# Patient Record
Sex: FEMALE | Race: OTHER | NOT HISPANIC OR LATINO | URBAN - METROPOLITAN AREA
[De-identification: names, ages, dates, MRNs, and addresses within clinical notes are randomized per-mention and may not be internally consistent; named-entity substitution may affect disease eponyms.]

---

## 2024-03-22 ENCOUNTER — OFFICE VISIT (OUTPATIENT)
Dept: PRIMARY CARE | Facility: CLINIC | Age: 29
End: 2024-03-22
Payer: COMMERCIAL

## 2024-03-22 VITALS
WEIGHT: 156.2 LBS | HEART RATE: 71 BPM | DIASTOLIC BLOOD PRESSURE: 80 MMHG | SYSTOLIC BLOOD PRESSURE: 116 MMHG | OXYGEN SATURATION: 98 %

## 2024-03-22 DIAGNOSIS — Z00.00 WELL WOMAN EXAM WITHOUT GYNECOLOGICAL EXAM: Primary | ICD-10-CM

## 2024-03-22 DIAGNOSIS — F50.2: ICD-10-CM

## 2024-03-22 PROCEDURE — 1036F TOBACCO NON-USER: CPT | Performed by: FAMILY MEDICINE

## 2024-03-22 PROCEDURE — 99385 PREV VISIT NEW AGE 18-39: CPT | Performed by: FAMILY MEDICINE

## 2024-03-22 RX ORDER — NORGESTIMATE AND ETHINYL ESTRADIOL 0.25-0.035
KIT ORAL
COMMUNITY
Start: 2015-08-18

## 2024-03-22 RX ORDER — FLUOXETINE HYDROCHLORIDE 40 MG/1
40 CAPSULE ORAL DAILY
Qty: 90 CAPSULE | Refills: 3 | Status: SHIPPED | OUTPATIENT
Start: 2024-03-22 | End: 2025-03-22

## 2024-03-22 RX ORDER — FLUOXETINE HYDROCHLORIDE 40 MG/1
1 CAPSULE ORAL DAILY
COMMUNITY
Start: 2018-10-11 | End: 2024-03-22 | Stop reason: SDUPTHER

## 2024-03-22 ASSESSMENT — PATIENT HEALTH QUESTIONNAIRE - PHQ9
2. FEELING DOWN, DEPRESSED OR HOPELESS: NOT AT ALL
SUM OF ALL RESPONSES TO PHQ9 QUESTIONS 1 AND 2: 0
1. LITTLE INTEREST OR PLEASURE IN DOING THINGS: NOT AT ALL

## 2024-03-22 ASSESSMENT — ENCOUNTER SYMPTOMS
BACK PAIN: 0
ARTHRALGIAS: 0
DECREASED CONCENTRATION: 0
SHORTNESS OF BREATH: 0
UNEXPECTED WEIGHT CHANGE: 0
CONSTIPATION: 0
DIARRHEA: 0
HEADACHES: 0
FATIGUE: 0
NECK PAIN: 0
ROS GI COMMENTS: DENIES HEARTBURN
ABDOMINAL PAIN: 0
SLEEP DISTURBANCE: 0
NERVOUS/ANXIOUS: 0

## 2024-03-22 NOTE — PROGRESS NOTES
Subjective   Patient ID: Ashley Yousif is a 28 y.o. female who presents for Annual Exam (PT is here for yearly physical).  HPI  Ashley presents for wellness visit.  Generally she feels well.  She does have OB/GYN.  She had a syncopal episode 2 weekends ago at a concert; this had never happened before.  She was not intoxicated; possibly dehydrated.  Review of Systems   Constitutional:  Negative for fatigue and unexpected weight change.   HENT:  Negative for hearing loss.    Eyes:  Negative for visual disturbance.   Respiratory:  Negative for shortness of breath.    Cardiovascular:  Negative for chest pain.   Gastrointestinal:  Negative for abdominal pain, constipation and diarrhea.        Denies heartburn   Genitourinary:  Negative for menstrual problem.   Musculoskeletal:  Negative for arthralgias, back pain and neck pain.   Skin:         Mild eczema, worse in winter   Neurological:  Negative for headaches.   Psychiatric/Behavioral:  Negative for decreased concentration and sleep disturbance. The patient is not nervous/anxious.        Objective   Vitals:    03/22/24 0802   BP: 116/80   Pulse: 71   SpO2: 98%   Weight: 70.9 kg (156 lb 3.2 oz)      Physical Exam  Constitutional:       Appearance: Normal appearance.   HENT:      Head: Normocephalic and atraumatic.   Eyes:      Extraocular Movements: Extraocular movements intact.      Pupils: Pupils are equal, round, and reactive to light.   Cardiovascular:      Rate and Rhythm: Normal rate and regular rhythm.   Pulmonary:      Effort: Pulmonary effort is normal.      Breath sounds: Normal breath sounds.   Abdominal:      General: There is no distension.      Tenderness: There is no abdominal tenderness.   Musculoskeletal:      Cervical back: Normal range of motion and neck supple.   Neurological:      General: No focal deficit present.      Mental Status: She is alert and oriented to person, place, and time.   Psychiatric:         Mood and Affect: Mood normal.          Behavior: Behavior normal.         Assessment/Plan   There are no diagnoses linked to this encounter.    Problem List Items Addressed This Visit             ICD-10-CM    Bulimia nervosa in full remission F50.2     Has tolerated Fluoxetine without problem, will continue         Relevant Medications    FLUoxetine (PROzac) 40 mg capsule     Other Visit Diagnoses         Codes    Well woman exam without gynecological exam    -  Primary Z00.00    In great shape.  Has OB/GYN.  Can follow up every 1-2 years if healthy.